# Patient Record
Sex: MALE | Race: OTHER | HISPANIC OR LATINO | URBAN - METROPOLITAN AREA
[De-identification: names, ages, dates, MRNs, and addresses within clinical notes are randomized per-mention and may not be internally consistent; named-entity substitution may affect disease eponyms.]

---

## 2018-10-26 ENCOUNTER — EMERGENCY (EMERGENCY)
Facility: HOSPITAL | Age: 33
LOS: 1 days | Discharge: HOME | End: 2018-10-26
Attending: EMERGENCY MEDICINE | Admitting: EMERGENCY MEDICINE
Payer: OTHER MISCELLANEOUS

## 2018-10-26 VITALS
SYSTOLIC BLOOD PRESSURE: 111 MMHG | HEART RATE: 63 BPM | DIASTOLIC BLOOD PRESSURE: 66 MMHG | RESPIRATION RATE: 16 BRPM | TEMPERATURE: 98 F | OXYGEN SATURATION: 99 %

## 2018-10-26 VITALS
RESPIRATION RATE: 16 BRPM | OXYGEN SATURATION: 100 % | HEART RATE: 69 BPM | DIASTOLIC BLOOD PRESSURE: 64 MMHG | TEMPERATURE: 98 F | SYSTOLIC BLOOD PRESSURE: 132 MMHG

## 2018-10-26 PROCEDURE — 99283 EMERGENCY DEPT VISIT LOW MDM: CPT

## 2018-10-26 RX ORDER — IBUPROFEN 200 MG
1 TABLET ORAL
Qty: 16 | Refills: 0 | OUTPATIENT
Start: 2018-10-26 | End: 2018-10-29

## 2018-10-26 RX ORDER — DEXAMETHASONE 0.5 MG/5ML
10 ELIXIR ORAL ONCE
Qty: 0 | Refills: 0 | Status: COMPLETED | OUTPATIENT
Start: 2018-10-26 | End: 2018-10-26

## 2018-10-26 RX ORDER — LIDOCAINE 4 G/100G
1 CREAM TOPICAL
Qty: 1 | Refills: 0 | OUTPATIENT
Start: 2018-10-26 | End: 2018-10-30

## 2018-10-26 RX ORDER — IBUPROFEN 200 MG
600 TABLET ORAL ONCE
Qty: 0 | Refills: 0 | Status: COMPLETED | OUTPATIENT
Start: 2018-10-26 | End: 2018-10-26

## 2018-10-26 RX ORDER — DIAZEPAM 5 MG
5 TABLET ORAL ONCE
Qty: 0 | Refills: 0 | Status: DISCONTINUED | OUTPATIENT
Start: 2018-10-26 | End: 2018-10-26

## 2018-10-26 RX ORDER — LIDOCAINE 4 G/100G
1 CREAM TOPICAL ONCE
Qty: 0 | Refills: 0 | Status: COMPLETED | OUTPATIENT
Start: 2018-10-26 | End: 2018-10-26

## 2018-10-26 RX ORDER — DIAZEPAM 5 MG
1 TABLET ORAL
Qty: 3 | Refills: 0 | OUTPATIENT
Start: 2018-10-26 | End: 2018-10-28

## 2018-10-26 RX ORDER — ACETAMINOPHEN 500 MG
975 TABLET ORAL ONCE
Qty: 0 | Refills: 0 | Status: COMPLETED | OUTPATIENT
Start: 2018-10-26 | End: 2018-10-26

## 2018-10-26 RX ADMIN — Medication 600 MILLIGRAM(S): at 15:26

## 2018-10-26 RX ADMIN — Medication 5 MILLIGRAM(S): at 15:26

## 2018-10-26 RX ADMIN — Medication 10 MILLIGRAM(S): at 15:26

## 2018-10-26 RX ADMIN — LIDOCAINE 1 PATCH: 4 CREAM TOPICAL at 15:26

## 2018-10-26 RX ADMIN — Medication 975 MILLIGRAM(S): at 15:25

## 2018-10-26 NOTE — ED PROVIDER NOTE - MUSCULOSKELETAL, MLM
No bony ttp to Midline neck and back. Thoracic back b/l w/ significant muscle spasm and associated ttp, but strength is normal in all 4 extremities. Gait intact but w/ pain.

## 2018-10-26 NOTE — ED PROVIDER NOTE - OBJECTIVE STATEMENT
34 y/o M w/ no pertinent PMH presents to ED c/o lower back pain, radiating down R leg s/p back injury at work 3 days ago. 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 Pt states the pain has been constant ever since, and worsens with movement. Pt took Tylenol yesterday for pain relief. Pt is able to ambulate here in the ED, but w/ pain. Denies any bowel/bladder incontinence or retention, numbness, tingling, weakness, or other acute complaints. NKDA.

## 2018-10-26 NOTE — ED PROVIDER NOTE - PLAN OF CARE
Follow up with your Doctor in 1-2 days.  Heat to back.  Take Ibuprofen 600mg orally every 6 hours as needed for pain take with food.  Lidoderm patch apply once a day to affected area for a maximum of 12 hours.  Valium 5mg orally once a day at bedtime as needed for muscle spasm don't drive or drink alcohol while taking this medications.  Return to the ER for any persistent/worsening or new symptoms, weakness, numbness, difficulty urinating or any concerning symptoms.

## 2018-10-26 NOTE — ED PROVIDER NOTE - MEDICAL DECISION MAKING DETAILS
32 y/o M w/ likely muscle spasm and pain, no concern for fracture or spinal chord injury. Plan to give symptom control and discharge to follow up. Pt will receive diazepam but has someone to pick him up.

## 2018-10-26 NOTE — ED PROVIDER NOTE - CARE PLAN
Principal Discharge DX:	Acute bilateral low back pain with right-sided sciatica Principal Discharge DX:	Acute bilateral low back pain with right-sided sciatica  Assessment and plan of treatment:	Follow up with your Doctor in 1-2 days.  Heat to back.  Take Ibuprofen 600mg orally every 6 hours as needed for pain take with food.  Lidoderm patch apply once a day to affected area for a maximum of 12 hours.  Valium 5mg orally once a day at bedtime as needed for muscle spasm don't drive or drink alcohol while taking this medications.  Return to the ER for any persistent/worsening or new symptoms, weakness, numbness, difficulty urinating or any concerning symptoms.

## 2018-10-26 NOTE — ED PROVIDER NOTE - PROGRESS NOTE DETAILS
KATHERIN Siu:(QUID PA) pt feels better pain improved ambulating without difficulty.  Discharge reviewed and discussed with patient.

## 2020-03-26 ENCOUNTER — EMERGENCY (EMERGENCY)
Facility: HOSPITAL | Age: 35
LOS: 1 days | Discharge: HOME | End: 2020-03-26
Attending: EMERGENCY MEDICINE | Admitting: EMERGENCY MEDICINE
Payer: COMMERCIAL

## 2020-03-26 VITALS
OXYGEN SATURATION: 97 % | HEART RATE: 96 BPM | SYSTOLIC BLOOD PRESSURE: 119 MMHG | TEMPERATURE: 99 F | RESPIRATION RATE: 16 BRPM | DIASTOLIC BLOOD PRESSURE: 84 MMHG

## 2020-03-26 PROCEDURE — 71045 X-RAY EXAM CHEST 1 VIEW: CPT | Mod: 26

## 2020-03-26 PROCEDURE — 99283 EMERGENCY DEPT VISIT LOW MDM: CPT

## 2020-03-26 RX ORDER — AZITHROMYCIN 500 MG/1
1 TABLET, FILM COATED ORAL
Qty: 7 | Refills: 0
Start: 2020-03-26 | End: 2020-04-01

## 2020-03-26 NOTE — ED ADULT NURSE NOTE - PRO INTERPRETER NEED 2
English
Alert-The patient is alert, awake and responds to voice. The patient is oriented to time, place, and person. The triage nurse is able to obtain subjective information.

## 2020-03-26 NOTE — ED PROVIDER NOTE - NSFOLLOWUPINSTRUCTIONS_ED_ALL_ED_FT
Recommendations for Patients Advised to Self-Isolate for Possible COVID-19 Exposure  We recommend the below precautionary steps from now until 14 days from when you returned from your travel or date of your last known possible contact:  ? Do not go to work, school, or public areas. Avoid using public transportation, ride-sharing, or taxis.  ? As much as possible, separate yourself from other people in your home. If you can, you should stay in a room and away from other people in your home. Also, you should use a separate bathroom, if available.  ? Wear the supplied mask whenever you are around other people.  ? If you have a non-urgent medical appointment, please reschedule for a later date. If the appointment is urgent, please call the healthcare provider and tell them that you are on selfisolation for possible COVID-19. This will help the healthcare provider’s office take steps to keep other people from getting infected or exposed. If you can reschedule routine appointments, do so.  ? Wash your hands often with soap and water for at least 15 to 20 seconds or clean your hands with an alcohol-based hand  that contains 60 to 95% alcohol, covering all surfaces of your hands and rubbing them together until they feel dry. Soap and water should be used preferentially if hands are visibly dirty.  ? Cover your mouth and nose with a tissue when you cough or sneeze. Throw used tissues in a lined trash can; immediately wash your hands.  ? Avoid touching your eyes, nose, and mouth with your hands.  ? Avoid sharing personal household items. You should not share dishes, drinking glasses, cups, eating utensils, towels, or bedding with other people or pets in your home. After using these items, they should be washed thoroughly with soap and water.  ? Clean and disinfect all “high-touch” surfaces every day. High touch surfaces include counters, tabletops, doorknobs, light switches, remote controls, bathroom fixtures, toilets, phones, keyboards, tablets, and bedside tables. Also, clean any surfaces that may have blood, stool, or body fluids on them  Pneumonia in Children    WHAT YOU NEED TO KNOW:    Pneumonia is an infection in one or both lungs. Pneumonia can be caused by bacteria, viruses, fungi, or parasites. Viruses are usually the cause of pneumonia in children. Children with viral pneumonia can also develop bacterial pneumonia. Often, pneumonia begins after an infection of the upper respiratory tract (nose and throat). This causes fluid to collect in the lungs, making it hard to breathe. Pneumonia can also occur if foreign material, such as food or stomach acid, is inhaled into the lungs.

## 2020-03-26 NOTE — ED PROVIDER NOTE - OBJECTIVE STATEMENT
34 y/o male with cough for one month and fever last week  also c/o difficulty tasting things  no sig pmh

## 2020-03-26 NOTE — ED PROVIDER NOTE - PATIENT PORTAL LINK FT
You can access the FollowMyHealth Patient Portal offered by [facility] by registering at the following website: [email or web address]. By joining [facility]’s FollowMyHealth portal, you will also be able to view your health information using other applications (apps) compatible with our system.

## 2020-03-26 NOTE — ED PROVIDER NOTE - CLINICAL SUMMARY MEDICAL DECISION MAKING FREE TEXT BOX
Right lower lobe infiltrate  trt with zithromax  advised to return with any difficulty breathing  pox 97 after walking

## 2022-02-01 NOTE — ED PROVIDER NOTE - NS ED MD EM SELECTION
Goal Outcome Evaluation:              Outcome Summary: pt. has had some c/o pain this shift, he has been treated per MAR. Patient did state he was in a great deal of pain, but passed some gas which relieved his 8/10 pain. He did work with therapy today and walked around the nurses station numberous times. I did monitor his 02 on RA and he dropped to mid 80's with activity. He is able to make all needs known, daughter at bedside and very involed in patients care. Care plan on-going   [id number] Exp Problem Focused - Mod. Complex